# Patient Record
Sex: FEMALE | Race: WHITE | HISPANIC OR LATINO
[De-identification: names, ages, dates, MRNs, and addresses within clinical notes are randomized per-mention and may not be internally consistent; named-entity substitution may affect disease eponyms.]

---

## 2019-10-15 ENCOUNTER — APPOINTMENT (OUTPATIENT)
Dept: ORTHOPEDIC SURGERY | Facility: CLINIC | Age: 39
End: 2019-10-15
Payer: COMMERCIAL

## 2019-10-15 VITALS — BODY MASS INDEX: 17.27 KG/M2 | HEIGHT: 67 IN | WEIGHT: 110 LBS

## 2019-10-15 PROBLEM — Z00.00 ENCOUNTER FOR PREVENTIVE HEALTH EXAMINATION: Status: ACTIVE | Noted: 2019-10-15

## 2019-10-15 PROCEDURE — 99204 OFFICE O/P NEW MOD 45 MIN: CPT
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
## 2019-10-15 NOTE — PHYSICAL EXAM
[de-identified] : Right knee shows no warmth or significant swelling. Clean abrasions are noted. There is tenderness over the patella she can straight leg raise easily. Neurovascular exam is normal no joint line pain no instability [de-identified] : [facility] x-rays that were performed showing nondisplaced fracture of the distal pole of the patella

## 2019-10-15 NOTE — DISCUSSION/SUMMARY
[de-identified] : Placed into knee immobilizer in extension. Follow up in one week with an x-ray of her right knee AP and lateral. Healing time discussed.

## 2019-10-15 NOTE — REVIEW OF SYSTEMS
[Arthralgia] : arthralgia [Joint Pain] : no joint pain [Joint Stiffness] : joint stiffness [Joint Swelling] : no joint swelling [Negative] : Heme/Lymph

## 2019-10-23 ENCOUNTER — APPOINTMENT (OUTPATIENT)
Dept: ORTHOPEDIC SURGERY | Facility: CLINIC | Age: 39
End: 2019-10-23

## 2019-11-04 ENCOUNTER — APPOINTMENT (OUTPATIENT)
Dept: ORTHOPEDIC SURGERY | Facility: CLINIC | Age: 39
End: 2019-11-04
Payer: COMMERCIAL

## 2019-11-04 PROCEDURE — 99213 OFFICE O/P EST LOW 20 MIN: CPT

## 2019-11-04 NOTE — PHYSICAL EXAM
[de-identified] : Right knee shows minimal tenderness on palpation minimal swelling. She can straight leg raise against gravity without her brace. There is some swelling of the leg. It is below the area where the brace was. There is a negative Adelaide sign. There is no palpable cords. There is no warmth or redness. [de-identified] : X-rays from [facility] show a nondisplaced patella fracture

## 2019-11-04 NOTE — DISCUSSION/SUMMARY
[de-identified] : This patient is doing well. I recommend getting. ultrasound of her right leg. She is in a continue with the aspirin a day. She'll follow up in 2 weeks for an x-ray of her right knee. I will call her with the results of the Doppler ultrasound.
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
## 2019-11-05 ENCOUNTER — TRANSCRIPTION ENCOUNTER (OUTPATIENT)
Age: 39
End: 2019-11-05

## 2019-11-18 ENCOUNTER — APPOINTMENT (OUTPATIENT)
Dept: ORTHOPEDIC SURGERY | Facility: CLINIC | Age: 39
End: 2019-11-18
Payer: COMMERCIAL

## 2019-11-18 DIAGNOSIS — S82.001A UNSPECIFIED FRACTURE OF RIGHT PATELLA, INITIAL ENCOUNTER FOR CLOSED FRACTURE: ICD-10-CM

## 2019-11-18 PROCEDURE — 73562 X-RAY EXAM OF KNEE 3: CPT | Mod: RT

## 2019-11-18 PROCEDURE — 99213 OFFICE O/P EST LOW 20 MIN: CPT

## 2019-11-18 NOTE — DISCUSSION/SUMMARY
[de-identified] : A prescription for physical therapy was given home exercises were discussed follow up will be in 3 weeks for a clinical check. She is doing well
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
## 2019-11-18 NOTE — PHYSICAL EXAM
[de-identified] : Right knee shows no pain on palpation minimal swelling. She can straight leg raise she can bend it 90°. Neurovascular exam is normal no instability [de-identified] : Right knee x-ray shows a healed patella fracture nondisplaced

## 2019-12-09 ENCOUNTER — APPOINTMENT (OUTPATIENT)
Dept: ORTHOPEDIC SURGERY | Facility: CLINIC | Age: 39
End: 2019-12-09
Payer: COMMERCIAL

## 2019-12-09 DIAGNOSIS — M23.91 UNSPECIFIED INTERNAL DERANGEMENT OF RIGHT KNEE: ICD-10-CM

## 2019-12-09 PROCEDURE — 99213 OFFICE O/P EST LOW 20 MIN: CPT

## 2019-12-09 NOTE — PHYSICAL EXAM
[de-identified] : Right knee shows no warmth or swelling full range of motion. Minimal discomfort on palpation 5/5 strength neurovascular exam is normal

## 2019-12-09 NOTE — DISCUSSION/SUMMARY
[de-identified] : The patient is doing very well continue to exercise she will follow up as needed.

## 2021-03-15 ENCOUNTER — TRANSCRIPTION ENCOUNTER (OUTPATIENT)
Age: 41
End: 2021-03-15

## 2021-03-16 ENCOUNTER — RESULT REVIEW (OUTPATIENT)
Age: 41
End: 2021-03-16

## 2021-03-16 ENCOUNTER — OUTPATIENT (OUTPATIENT)
Dept: OUTPATIENT SERVICES | Facility: HOSPITAL | Age: 41
LOS: 1 days | Discharge: HOME | End: 2021-03-16
Payer: COMMERCIAL

## 2021-03-16 PROCEDURE — 88307 TISSUE EXAM BY PATHOLOGIST: CPT | Mod: 26

## 2021-03-16 PROCEDURE — 88305 TISSUE EXAM BY PATHOLOGIST: CPT | Mod: 26

## 2021-03-23 LAB — SURGICAL PATHOLOGY STUDY: SIGNIFICANT CHANGE UP
